# Patient Record
Sex: FEMALE | Race: WHITE | URBAN - METROPOLITAN AREA
[De-identification: names, ages, dates, MRNs, and addresses within clinical notes are randomized per-mention and may not be internally consistent; named-entity substitution may affect disease eponyms.]

---

## 2017-10-05 ENCOUNTER — OFFICE VISIT (OUTPATIENT)
Dept: ORTHOPEDIC SURGERY | Age: 52
End: 2017-10-05

## 2017-10-05 VITALS
BODY MASS INDEX: 30.82 KG/M2 | DIASTOLIC BLOOD PRESSURE: 77 MMHG | HEART RATE: 71 BPM | WEIGHT: 185 LBS | HEIGHT: 65 IN | SYSTOLIC BLOOD PRESSURE: 134 MMHG

## 2017-10-05 DIAGNOSIS — M75.01 ADHESIVE CAPSULITIS OF RIGHT SHOULDER: Primary | ICD-10-CM

## 2017-10-05 PROCEDURE — 20610 DRAIN/INJ JOINT/BURSA W/O US: CPT | Performed by: ORTHOPAEDIC SURGERY

## 2017-10-05 PROCEDURE — 99203 OFFICE O/P NEW LOW 30 MIN: CPT | Performed by: ORTHOPAEDIC SURGERY

## 2017-10-05 NOTE — PROGRESS NOTES
NEW PATIENT ORTHOPAEDIC NOTE    Chief Complaint   Patient presents with    Shoulder Injury     Right. Neal Marin at home 3 weeks ago. Shoulder Injury        47 y.o. female seen for evaluation of right shoulder injury after a fall at home 3 weeks ago. She reports she just fell, didn't trip, didn't have a syncopal episode. She reports laying there for ~5 minutes. She denies history of similar symptoms or falls. She denies seizures or vertigo. She remembers all details of the fall. She fell onto the right shoulder/arm, and has had pain since. She went to urgent care after the fall, XRs were taken, and negative, but she has been afraid to move the shoulder. Pain is worse with any movement. She is RHD. I have reviewed and discussed the below pain assessment findings with the patient. Pain Assessment  Location of Pain: Shoulder  Location Modifiers: Right  Severity of Pain: 5  Quality of Pain: Aching  Duration of Pain: Persistent  Frequency of Pain: Intermittent  Date Pain First Started: 09/07/17  Aggravating Factors: Other (Comment) (reaching up)  Limiting Behavior: Yes  Relieving Factors: Rest  Result of Injury: Yes  Work-Related Injury: No  Are there other pain locations you wish to document?: No    ROS  I have read over the ROS from the Patient History Form dated on 10/5/2017  Pertinent positives include headaches, sinus trouble  Rest of 13 point ROS otherwise negative except per HPI, and scanned into the patient's chart under the Media tab. Allergies   Allergen Reactions    Codeine     Penicillins         No current outpatient prescriptions on file. No current facility-administered medications for this visit. History reviewed. No pertinent past medical history. History reviewed. No pertinent surgical history. History reviewed. No pertinent family history.     Social History     Social History    Marital status:      Spouse name: N/A    Number of children: N/A    Years of education: N/A     Occupational History          Social History Main Topics    Smoking status: Never Smoker    Smokeless tobacco: Not on file    Alcohol use Not on file    Drug use: Not on file    Sexual activity: Not on file     Other Topics Concern    Not on file     Social History Narrative    No narrative on file        Vitals:    10/05/17 1516   BP: 134/77   Pulse: 71   Weight: 185 lb (83.9 kg)   Height: 5' 5\" (1.651 m)       Physical Exam  Constitutional  well-groomed, well-nourished, overweight  Psychiatric  pleasant,  normal mood & affect, oriented to place, person, and situation  Cardiovascular  RRR, negative peripheral edema, no varicosities, radial pulse 2+  Respiratory  respirations even and unlabored  HEENT - normocephalic, atraumatic. Skin  no rashes, wounds, or lesions seen  Neck/Spine - mildly decreased cervical ROM, no TTP of spinous processes, mild TTP right trapezius,  negative Spurling's  Neurological - SILT M/U/R/A nerve distributions; AIN/PIN/IO intact  Left Upper Extremity: Examination of the left upper extremity does not show any tenderness, deformity or injury. Range of motion is unremarkable. There is no gross instability. There are no rashes, ulcerations or lesions. Strength and tone are normal.  RightRight shoulder:   No obvious deformity/swelling/ecchymosis   No atrophy seen, of the infraspinatus fossa, of the supraspinatus fossa and deltoid    moderate TTP over bicipital groove    Range of Motion:     Forward flexion:   Active assist to 90 with severe pain    Abduction:  30    External rotation with arm at side:   40 (80 on left)    Internal rotation:  Back pocket (T7 on left)   Strength:  Not assessed      Imaging:  Images were personally reviewed by myself and discussed with the patient  Right shoulder 4 views performed today in clinic   - glenohumeral articulation is mildly narrowed with small osteophytes seen, there are no loose bodies appreciated. There is no evidence of a high-riding humeral head. On axillary view, the humeral head is well-centered within the glenoid. The acromioclavicular joint demonstrates mild degenerative changes. The tuberosities are normal in appearance. Calcific tendonitis is absent. Assessment & Plan:  47 y.o. female who presents with   1. Adhesive capsulitis of right shoulder  Ambulatory referral to Physical Therapy    LA ARTHROCENTESIS ASPIR&/INJ MAJOR JT/BURSA W/O US    LA TRIAMCINOLONE ACETONIDE INJ    severe             Procedures    LA ARTHROCENTESIS ASPIR&/INJ MAJOR JT/BURSA W/O US    LA TRIAMCINOLONE ACETONIDE INJ     Discussed development of frozen shoulder and pathoanatomy. She has more stiffness than I would expect for an injury that occurred just 3 weeks ago. Informed the patient it is more common in females, age 44-60s, and associated with patients with endocrinopathy such as DM and thyroid disease. In cases associated with diabetes, disease is typically more aggressive, and more refractory to both conservative and surgical treatment  Informed the patient it will be a long process, and to not expect a quick resolution  Clinical stages of pain, stiffness, and thawing can extend over a year     She does not have a PCP, and I have provided her a list of PCPs within the [facility] so that she can establish care. Endocrine workup recommended. Inflammation/pain management - ice, NSAIDs, intraarticular corticosteroid injections - to allow meaningful participation in physical therapy and home stretching program  Sometimes multiple injections are required to break the inflammation    Risks and benefits of a steroid injection were discussed with the patient, including the possibility of adverse local site reactions such as dermal atrophy and skin discoloration. Although rare, an infection is possible and may necessitate surgical treatment if it occurs in a joint or develops into an abscess. Finally, a rise in blood sugar levels is anticipated, particularly in diabetics. Diabetic patients were instructed to monitor their blood glucose levels after the injection and to adjust their insulin regimen as appropriate. The patient elected to proceed, and after verbal consent was obtained and drug allergies were reviewed, the injection site was prepped with alcohol and ChloraPrep. 40mg of Kenalog mixed with lidocaine and marcaine (no epinephrine) was injected into the right shoulder (intra-articular). There were no immediate complications after the procedure. The patient was advised to ice the area intermittently over the next 24-48 hours until the corticosteroid becomes effective.     FU 6 weeks to check progress    Adry Coy

## 2017-10-05 NOTE — PATIENT INSTRUCTIONS
Frozen Shoulder: Exercises  Your Care Instructions  Here are some examples of typical rehabilitation exercises for your condition. Start each exercise slowly. Ease off the exercise if you start to have pain. Your doctor or physical therapist will tell you when you can start these exercises and which ones will work best for you. How to do the exercises  Neck stretches    1. Look straight ahead, and tip your right ear to your right shoulder. Do not let your left shoulder rise up as you tip your head to the right. 2. Hold 15 to 30 seconds. 3. Tilt your head to the left. Do not let your right shoulder rise up as you tip your head to the left. 4. Hold for 15 to 30 seconds. 5. Repeat 2 to 4 times to each side. Shoulder rolls    1. Sit comfortably with your feet shoulder-width apart. You can also do this exercise while standing. 2. Roll your shoulders up, then back, and then down in a smooth, circular motion. 3. Repeat 2 to 4 times. Shoulder flexion (lying down)    Note: For this exercise, you will need a wand. To make a wand, use a piece of PVC pipe or a broom handle with the broom removed. Make the wand about a foot wider than your shoulders. 1. Lie on your back, holding a wand with your hands. Your palms should face down as you hold the wand. Place your hands slightly wider than your shoulders. 2. Keeping your elbows straight, slowly raise your arms over your head until you feel a stretch in your shoulders, upper back, and chest.  3. Hold 15 to 30 seconds. 4. Repeat 2 to 4 times. Shoulder rotation (lying down)    Note: To make a wand, use a piece of PVC pipe or a broom handle with the broom removed. Make the wand about a foot wider than your shoulders. 1. Lie on your back and hold a wand in both hands with your elbows bent and your palms up. 2. Keeping your elbows close to your body, move the wand across your body toward the arm that has pain. 3. Hold for 15 to 30 seconds.   4. Repeat 2 to 4 times.  Shoulder internal rotation with towel    1. Roll up a towel lengthwise. Hold the towel above and behind your head with the arm that is not sore. 2. With your sore arm, reach behind your back and grasp the towel. 3. Using the arm above your head, pull the towel upward until you feel a stretch on the front and outside of your sore shoulder. 4. Hold 15 to 30 seconds. 5. Relax and move the towel back down to the starting position. 6. Repeat 2 to 4 times. Shoulder blade squeeze    1. While standing with your arms at your sides, squeeze your shoulder blades together. Do not raise your shoulders up as you are squeezing. 2. Hold for 6 seconds. 3. Repeat 8 to 12 times. Follow-up care is a key part of your treatment and safety. Be sure to make and go to all appointments, and call your doctor if you are having problems. It's also a good idea to know your test results and keep a list of the medicines you take. Where can you learn more? Go to [email or web address] and sign in to your [employer] account. Enter [phone] in the [employer] box to learn more about \"Frozen Shoulder: Exercises. \"     If you do not have an account, please click on the \"Sign Up Now\" link. Current as of: March 21, 2017  Content Version: 11.3  © 6819-7064 [employer]. Care instructions adapted under license by [facility] ([facility]). If you have questions about a medical condition or this instruction, always ask your healthcare professional. [place] [address] any warranty or liability for your use of this information.

## 2017-10-05 NOTE — MR AVS SNAPSHOT
people who are more muscular. Even a small weight loss (between 5 and 10 percent of your current weight) by decreasing your calorie intake and becoming more physically active will help lower your risk of developing or worsening diseases associated with obesity. Learn more at: [email or web address]          Instructions         Frozen Shoulder: Exercises  Your Care Instructions  Here are some examples of typical rehabilitation exercises for your condition. Start each exercise slowly. Ease off the exercise if you start to have pain. Your doctor or physical therapist will tell you when you can start these exercises and which ones will work best for you. How to do the exercises  Neck stretches    1. Look straight ahead, and tip your right ear to your right shoulder. Do not let your left shoulder rise up as you tip your head to the right. 2. Hold 15 to 30 seconds. 3. Tilt your head to the left. Do not let your right shoulder rise up as you tip your head to the left. 4. Hold for 15 to 30 seconds. 5. Repeat 2 to 4 times to each side. Shoulder rolls    1. Sit comfortably with your feet shoulder-width apart. You can also do this exercise while standing. 2. Roll your shoulders up, then back, and then down in a smooth, circular motion. 3. Repeat 2 to 4 times. Shoulder flexion (lying down)    Note: For this exercise, you will need a wand. To make a wand, use a piece of PVC pipe or a broom handle with the broom removed. Make the wand about a foot wider than your shoulders. 1. Lie on your back, holding a wand with your hands. Your palms should face down as you hold the wand. Place your hands slightly wider than your shoulders. 2. Keeping your elbows straight, slowly raise your arms over your head until you feel a stretch in your shoulders, upper back, and chest.  3. Hold 15 to 30 seconds. 4. Repeat 2 to 4 times.   Shoulder rotation (lying down) information. Medications and Orders      Allergies              Codeine     Penicillins       We Ordered/Performed the following           XR SHOULDER RIGHT (MIN 2 VIEWS)     Scheduling Instructions:    4 views rm 23    Comments:    3V Rt Shoulder         Result Summary for XR SHOULDER RIGHT (MIN 2 VIEWS)      Result Information     Status          Final result (Exam End: 10/5/2017  3:45 PM)           10/5/2017  3:45 PM      Narrative & Impression           Radiology result is complete; follow up with provider / physician office for radiology results                       Additional Information        Basic Information     Date Of Birth Sex Race Ethnicity Preferred Language    1965 Female White Non-/Non  English      Preventive Care        Date Due    HIV screening is recommended for all people regardless of risk factors  aged 15-65 years at least once (lifetime) who have never been HIV tested. 2/15/1981    Tetanus Combination Vaccine (1 - Tdap) 2/15/1985    Pap Smear 2/15/1987    Cholesterol Screening 2/15/2006    Diabetes Screening 2/15/2006    Mammograms are recommended every 2 years for low/average risk patients aged 48 - 69, and every year for high risk patients per updated national guidelines. However these guidelines can be individualized by your provider. 2/15/2016    Colonoscopy 2/15/2016    Yearly Flu Vaccine (1) 9/1/2017            6connect Signup           6connect allows you to send messages to your doctor, view your test results, renew your prescriptions, schedule appointments, view visit notes, and more. How Do I Sign Up? 1. In your Internet browser, go to [email or web address]  2. Click on the Sign Up Now link in the Sign In box. You will see the New Member Sign Up page. 3. Enter your 6connect Access Code exactly as it appears below. You will not need to use this code after youve completed the sign-up process.  If

## 2017-10-12 ENCOUNTER — HOSPITAL ENCOUNTER (OUTPATIENT)
Dept: OTHER | Age: 52
Discharge: HOME | End: 2017-10-31
Attending: ORTHOPAEDIC SURGERY | Admitting: ORTHOPAEDIC SURGERY

## 2017-10-12 NOTE — FLOWSHEET NOTE
care, reaching, carrying, lifting, house/yardwork, driving/computer work. Therapeutic Activities:    [x] (63431 or 36469) Provided verbal/tactile cueing for activities related to improving balance, coordination, kinesthetic sense, posture, motor skill, proprioception and motor activation to allow for proper function of scapular, scapulothoracic and UE control with self care, carrying, lifting, driving/computer work. Home Exercise Program:  Pt was instructed in, and safely and correctly demonstrated home exercise program. Patient verbalized understanding of proper frequency of exercises. Copy of exercises was scanned into patient chart and can be found in the media file.     [x] (48286) Reviewed/Progressed HEP activities related to strengthening, flexibility, endurance, ROM of scapular, scapulothoracic and UE control with self care, reaching, carrying, lifting, house/yardwork, driving/computer work  [] (59450) Reviewed/Progressed HEP activities related to improving balance, coordination, kinesthetic sense, posture, motor skill, proprioception of scapular, scapulothoracic and UE control with self care, reaching, carrying, lifting, house/yardwork, driving/computer work      Manual Treatments:  PROM / STM / Oscillations-Mobs:  G-I, II, III, IV (PA's, Inf., Post.)  [] (56805) Provided manual therapy to mobilize soft tissue/joints of cervical/CT, scapular GHJ and UE for the purpose of modulating pain, promoting relaxation,  increasing ROM, reducing/eliminating soft tissue swelling/inflammation/restriction, improving soft tissue extensibility and allowing for proper ROM for normal function with self care, reaching, carrying, lifting, house/yardwork, driving/computer work    Modalities:  Ice x 15 mins     Charges:  Timed Code Treatment Minutes: 56   Total Treatment Minutes: 76     [x] EVAL (LOW) 88678   [] EVAL (MOD) 26219   [] EVAL (HIGH) 52299   [] RE-EVAL   [x] AP(23435) x  1   [] IONTO  [] NMR (62758) x      [] VASO  [] Manual (46137) x       [] Other:  [x] TA x  1    [] Mech Traction (82606)  [] ES(attended) (72197)      [] ES (un) (90270):     GOALS:Short Term Goals: To be achieved in: 2 weeks  1. Independent in HEP and progression per patient tolerance, in order to prevent re-injury. 2. Patient will have a decrease in pain to 0/10 at rest to facilitate improvement in movement, function, and ADLs as indicated by Functional Deficits.     Long Term Goals: To be achieved in: 6 weeks  1. Disability index score of 74% or more for the UEFI to assist with reaching prior level of function. 2. Patient will demonstrate increased R shoulder AROM to 160 flex, 170 abd, BB to T8, and ER to 90 at 90 abd to allow for proper joint functioning as indicated by patients Functional Deficits. 3. Patient will demonstrate an increase in R shoulder strength to 4/5 throughout to allow for proper functional mobility as indicated by patients Functional Deficits. 4. Patient will return to ADLs at and above shoulder height without increased symptoms or restriction. 5. Patient will report being able to pain with her R UE without pain.        Progression Towards Functional goals:  [] Patient is progressing as expected towards functional goals listed. [] Progression is slowed due to complexities listed. [] Progression has been slowed due to co-morbidities.   [x] Plan just implemented, too soon to assess goals progression  [] Other:     ASSESSMENT:  See eval    Treatment/Activity Tolerance:  [x] Patient tolerated treatment well [] Patient limited by fatique  [] Patient limited by pain  [] Patient limited by other medical complications  [] Other:     Prognosis: [x] Good [] Fair  [] Poor    Patient Requires Follow-up: [x] Yes  [] No    PLAN: See eval; scap ret, pulleys, SL IR; consider PROM  [] Continue per plan of care [] Alter current plan (see comments)  [x] Plan of care initiated [] Hold pending MD visit [] Discharge    Electronically

## 2017-10-12 NOTE — PLAN OF CARE
[address], [address] [place] [address]  Phone: [phone]   Fax: [phone]          Physical Therapy Certification    Dear Referring Practitioner: Butch Castro MD,    We had the pleasure of evaluating the following patient for physical therapy services at [address] [place] [address]. A summary of our findings can be found in the initial assessment below. This includes our plan of care. If you have any questions or concerns regarding these findings, please do not hesitate to contact me at the office phone number checked above. Thank you for the referral.       Physician Signature:_______________________________Date:__________________  By signing above (or electronic signature), therapists plan is approved by physician      Patient: Zoila Murdock   : 1965   MRN: [id number]  Referring Physician: Referring Practitioner: Butch Castro MD      Evaluation Date: 10/12/2017      Medical Diagnosis Information:  Diagnosis: M75.01 (ICD-10-CM) - Adhesive capsulitis of right shoulder;    Treatment Diagnosis: M25.511 R shoulder pain; M25.611 R shoulder stiffness                                           Precautions/ Contra-indications:   Latex Allergy:  [x]NO      []YES  Preferred Language for Healthcare:   [x]English       []other:    SUBJECTIVE: Patient stated complaint: Per MD intake \"51 y.o. female seen for evaluation of right shoulder injury after a fall at home 3 weeks ago. She reports she just fell, didn't trip, didn't have a syncopal episode. She reports laying there for ~5 minutes. She denies history of similar symptoms or falls. She denies seizures or vertigo. She remembers all details of the fall.     She fell onto the right shoulder/arm, and has had pain since. She went to urgent care after the fall, XRs were taken, and negative, but she has been afraid to move the shoulder.     Pain is worse with any movement. She is RHD\"     Per today's intake: pt received a cortisone injection at MD appt and may have helped. She reports she was red in face and chest after her injection. Pt reports that she has had pain for about 2 months since a fall. She fell down 6 steps when she was distracted. She thinks she landed on her R UE. She couldn't move her R UE afterwards. She went to urgent care later. She had x-rays and showed no break. She thought it would get better on its own. She reports that she could not move her arm. She reports she was more freaked out that she would tear or break something so she just did not move her arm. She has been moving her arm more since her MD appt after being told to move her arm more. She reports she can lift her arm with her other arm but it is hard to move her R UE on her own. Her arm feels heavy. She was diagnosed with frozen shoulder. Pt reports she was told she had vertigo in the past.        Relevant Medical History:pt reports she has not been to  In years to check on blood work to check for diabetes and thyroid disease. Functional Disability Index: see below under G-code section  Relevant Medication:  ibuprofen  Current pain:  5/10 Aggravating, she is also getting pains her her UT and some headaches  Pain at worst:  5-6/10      Easing factors: rest  Provocative factors: all ADLs involving lifting and raising her arm and reaching behind her back.       Type: [x]Constant   []Intermittent  []Radiating [x]Localized []other:     Numbness/Tingling: none     Functional Limitations/Impairments: [x]Lifting/reaching [x]Grooming [x]Carrying    [x]ADL's [x]Driving [x]Sports/Recreations   []Other:    Occupation/School: , sitting at desk all day; housework; some painting    Living Status/Prior Level of Function: Independent with ADLs and IADLs, able to do housework without pain in her shoulder.   (insert highest prior level of function)    OBJECTIVE:     CERV ROM     Cervical Flexion WNL but (E78.5)  []Angina pectoris (I20)  []Atherosclerosis (I70)   Musculoskeletal conditions   []Disc pathology   []Congenital spine pathologies   []Prior surgical intervention  []Osteoporosis (M81.8)  []Osteopenia (M85.8)   Endocrine conditions   []Hypothyroid (E03.9)  []Hyperthyroid Gastrointestinal conditions   []Constipation (L16.51)   Metabolic conditions   []Morbid obesity (E66.01)  []Diabetes type 1(E10.65) or 2 (E11.65)   []Neuropathy (G60.9)     Pulmonary conditions   []Asthma (J45)  []Coughing   []COPD (J44.9)   Psychological Disorders  []Anxiety (F41.9)  []Depression (F32.9)   [x]Other: signs of depression on intake form   [x]Other:   Vertigo/dizziness       Barriers to/and or personal factors that will affect rehab potential:              []Age  []Sex              []Motivation/Lack of Motivation                        []Co-Morbidities              []Cognitive Function, education/learning barriers              []Environmental, home barriers              []profession/work barriers  []past PT/medical experience  []other:  Justification:      Falls Risk Assessment (30 days):   [x] Falls Risk assessed and no intervention required. - pt was distracted when she had her fall. Reports she is seeing PCP soon and was told to mention her dizziness/vertigo then.   [] Falls Risk assessed and Patient requires intervention due to being higher risk   TUG score (>12s at risk):     [] Falls education provided, including       G-Codes:  PT G-Codes  Functional Assessment Tool Used: UEFI  Score: 62.5%  Functional Limitation: Carrying, moving and handling objects  Carrying, Moving and Handling Objects Current Status (): At least 20 percent but less than 40 percent impaired, limited or restricted  Carrying, Moving and Handling Objects Goal Status ():  At least 1 percent but less than 20 percent impaired, limited or restricted    ASSESSMENT:   Functional Impairments   []Noted spinal or UE joint hypomobility   []Noted spinal or UE joint hypermobility   [x]Decreased UE functional ROM   [x]Decreased UE functional strength   []Abnormal reflexes/sensation/myotomal/dermatomal deficits   [x]Decreased RC/scapular/core strength and neuromuscular control   []other:      Functional Activity Limitations (from functional questionnaire and intake)   [x]Reduced ability to tolerate prolonged functional positions   [x]Reduced ability or difficulty with changes of positions or transfers between positions   [x]Reduced ability to maintain good posture and demonstrate good body mechanics with sitting, bending, and lifting   [x] Reduced ability or tolerance with driving and/or computer work   [x]Reduced ability to sleep   [x]Reduced ability to perform lifting, reaching, carrying tasks   [x]Reduced ability to tolerate impact through UE   [x]Reduced ability to reach behind back   [x]Reduced ability to  or hold objects   [x]Reduced ability to throw or toss an object   []other:    Participation Restrictions   [x]Reduced participation in self care activities   [x]Reduced participation in home management activities   [x]Reduced participation in work activities   [x]Reduced participation in social activities. [x]Reduced participation in sport/recreation activities. Classification:   []Signs/symptoms consistent with post-surgical status including decreased ROM, strength and function.   []Signs/symptoms consistent with joint sprain/strain   []Signs/symptoms consistent with shoulder impingement   []Signs/symptoms consistent with shoulder/elbow/wrist tendinopathy   []Signs/symptoms consistent with Rotator cuff tear   []Signs/symptoms consistent with labral tear   []Signs/symptoms consistent with postural dysfunction    []Signs/symptoms consistent with Glenohumeral IR Deficit - <45 degrees   []Signs/symptoms consistent with facet dysfunction of cervical/thoracic spine    []Signs/symptoms consistent with pathology which may benefit from Dry needling     [x]other: Signs/symptoms consistent with adhesive capsulitis    Prognosis/Rehab Potential:      []Excellent   [x]Good    []Fair   []Poor     Tolerance of evaluation/treatment:    []Excellent   [x]Good    []Fair   []Poor    PLAN:  Frequency/Duration:  2 days per week for 6 Weeks:  INTERVENTIONS:  [x] Therapeutic exercise including: strength training, ROM, for Upper extremity and core   [x]  NMR activation and proprioception for UE, scap and Core   [x] Manual therapy as indicated for shoulder, scapula and spine to include: Dry Needling/IASTM, STM, PROM, Gr I-IV mobilizations, manipulation. [x] Modalities as needed that may include: thermal agents, E-stim, Biofeedback, US, iontophoresis as indicated  [x] Patient education on joint protection, postural re-education, activity modification, progression of HEP. HEP instruction: Pt was instructed in, and safely and correctly demonstrated home exercise program. Patient verbalized understanding of proper frequency of exercises. Copy of exercises was scanned into patient chart and can be fount in the media file. GOALS:  Patient stated goal: get her arm back to normal, get back to using it regularly    Therapist goals for Patient:   Short Term Goals: To be achieved in: 2 weeks  1. Independent in HEP and progression per patient tolerance, in order to prevent re-injury. 2. Patient will have a decrease in pain to 0/10 at rest to facilitate improvement in movement, function, and ADLs as indicated by Functional Deficits. Long Term Goals: To be achieved in: 6 weeks  1. Disability index score of 74% or more for the UEFI to assist with reaching prior level of function. 2. Patient will demonstrate increased R shoulder AROM to 160 flex, 170 abd, BB to T8, and ER to 90 at 90 abd to allow for proper joint functioning as indicated by patients Functional Deficits.    3. Patient will demonstrate an increase in R shoulder strength to 4/5 throughout to allow for proper functional mobility as indicated by patients Functional Deficits. 4. Patient will return to ADLs at and above shoulder height without increased symptoms or restriction. 5. Patient will report being able to pain with her R UE without pain. Physical Therapy Evaluation Complexity Justification  [x] A history of present problem with:  [x] no personal factors and/or comorbidities that impact the plan of care;  []1-2 personal factors and/or comorbidities that impact the plan of care  []3 personal factors and/or comorbidities that impact the plan of care  [x] An examination of body systems using standardized tests and measures addressing any of the following: body structures and functions (impairments), activity limitations, and/or participation restrictions;:  [] a total of 1-2 or more elements   [] a total of 3 or more elements   [x] a total of 4 or more elements   [x] A clinical presentation with:  [x] stable and/or uncomplicated characteristics   [] evolving clinical presentation with changing characteristics  [] unstable and unpredictable characteristics;   [x] Clinical decision making of [x] low, [] moderate, [] high complexity using standardized patient assessment instrument and/or measurable assessment of functional outcome.     [x] EVAL (LOW) 59034 (typically 20 minutes face-to-face)  [] EVAL (MOD) 90738 (typically 30 minutes face-to-face)  [] EVAL (HIGH) 39413 (typically 45 minutes face-to-face)  [] RE-EVAL 61300    Electronically signed by:  Abdirizak Greene, PT, DPT

## 2017-10-17 ENCOUNTER — HOSPITAL ENCOUNTER (OUTPATIENT)
Dept: PHYSICAL THERAPY | Age: 52
Discharge: HOME | End: 2017-10-18
Admitting: ORTHOPAEDIC SURGERY

## 2017-10-19 ENCOUNTER — HOSPITAL ENCOUNTER (OUTPATIENT)
Dept: PHYSICAL THERAPY | Age: 52
Discharge: HOME | End: 2017-10-20
Admitting: ORTHOPAEDIC SURGERY

## 2017-10-19 ENCOUNTER — OFFICE VISIT (OUTPATIENT)
Dept: INTERNAL MEDICINE CLINIC | Age: 52
End: 2017-10-19

## 2017-10-19 VITALS
TEMPERATURE: 97.8 F | DIASTOLIC BLOOD PRESSURE: 88 MMHG | BODY MASS INDEX: 31.49 KG/M2 | SYSTOLIC BLOOD PRESSURE: 144 MMHG | RESPIRATION RATE: 16 BRPM | HEART RATE: 78 BPM | HEIGHT: 65 IN | WEIGHT: 189 LBS | OXYGEN SATURATION: 98 %

## 2017-10-19 DIAGNOSIS — Z13.1 SCREENING FOR DIABETES MELLITUS: ICD-10-CM

## 2017-10-19 DIAGNOSIS — R03.0 ELEVATED BLOOD PRESSURE READING IN OFFICE WITHOUT DIAGNOSIS OF HYPERTENSION: ICD-10-CM
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
## 2017-10-19 DIAGNOSIS — Z23 NEED FOR DIPHTHERIA-TETANUS-PERTUSSIS (TDAP) VACCINE: ICD-10-CM

## 2017-10-19 DIAGNOSIS — E66.9 OBESITY (BMI 30-39.9): ICD-10-CM

## 2017-10-19 DIAGNOSIS — M75.01 ADHESIVE CAPSULITIS OF RIGHT SHOULDER: Primary | ICD-10-CM
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
## 2017-10-19 PROCEDURE — 99204 OFFICE O/P NEW MOD 45 MIN: CPT | Performed by: INTERNAL MEDICINE

## 2017-10-19 PROCEDURE — 90471 IMMUNIZATION ADMIN: CPT | Performed by: INTERNAL MEDICINE

## 2017-10-19 PROCEDURE — 90715 TDAP VACCINE 7 YRS/> IM: CPT | Performed by: INTERNAL MEDICINE

## 2017-10-19 RX ORDER — BIOTIN 1 MG
TABLET ORAL DAILY
COMMUNITY

## 2017-10-19 RX ORDER — LANOLIN ALCOHOL/MO/W.PET/CERES
1000 CREAM (GRAM) TOPICAL DAILY
COMMUNITY

## 2017-10-19 RX ORDER — MULTIVIT-MIN/IRON/FOLIC ACID/K 18-600-40
1 CAPSULE ORAL DAILY
COMMUNITY

## 2017-10-19 RX ORDER — M-VIT,TX,IRON,MINS/CALC/FOLIC 27MG-0.4MG
1 TABLET ORAL DAILY
COMMUNITY

## 2017-10-19 ASSESSMENT — PATIENT HEALTH QUESTIONNAIRE - PHQ9
SUM OF ALL RESPONSES TO PHQ9 QUESTIONS 1 & 2: 0
SUM OF ALL RESPONSES TO PHQ QUESTIONS 1-9: 0
1. LITTLE INTEREST OR PLEASURE IN DOING THINGS: 0
2. FEELING DOWN, DEPRESSED OR HOPELESS: 0

## 2017-10-19 NOTE — PATIENT INSTRUCTIONS
desserts to only a few times a week. This includes sugar-sweetened drinks like soda. The Mediterranean diet may also include red wine with your meal1 glass each day for women and up to 2 glasses a day for men. Tips for eating at home  · Use herbs, spices, garlic, lemon zest, and citrus juice instead of salt to add flavor to foods. · Add avocado slices to your sandwich instead of naidu. · Have fish for lunch or dinner instead of red meat. Brush the fish with olive oil, and broil or grill it. · Sprinkle your salad with seeds or nuts instead of cheese. · Cook with olive or canola oil instead of butter or oils that are high in saturated fat. · Switch from 2% milk or whole milk to 1% or fat-free milk. · Dip raw vegetables in a vinaigrette dressing or hummus instead of dips made from mayonnaise or sour cream.  · Have a piece of fruit for dessert instead of a piece of cake. Try baked apples, or have some dried fruit. Tips for eating out  · Try broiled, grilled, baked, or poached fish instead of having it fried or breaded. · Ask your  to have your meals prepared with olive oil instead of butter. · Order dishes made with marinara sauce or sauces made from olive oil. Avoid sauces made from cream or mayonnaise. · Choose whole-grain breads, whole wheat pasta and pizza crust, brown rice, beans, and lentils. · Cut back on butter or margarine on bread. Instead, you can dip your bread in a small amount of olive oil. · Ask for a side salad or grilled vegetables instead of french fries or chips. Where can you learn more? Go to https://[employer]. org and sign in to your [employer] account. Enter [phone] in the [facility] box to learn more about \"Learning About the Mediterranean Diet. \"     If you do not have an account, please click on the \"Sign Up Now\" link. Current as of: December 29, 2016  Content Version: 11.3  © 4935-2998 [employer].  Care instructions adapted under

## 2017-10-19 NOTE — PROGRESS NOTES
New Patient H&P    Date of Service:  10/19/2017  Address: [address] [place] [address] INTERNAL MEDICINE  [address] [address]  Dept: [phone]    Subjective:      Patient ID: [id number]  Zoila Murdock is a 47 y.o. female with:  Chief Complaint   Patient presents with    Established New Doctor     Hasn't seen a PCP in a number of years. No complaints.  Health Maintenance     Patient notified she is due for Mammogram and colonoscopy. She will take Tdap today. HPI: Gilford Eisenmenger comes in to establish care today for right shoulder pain after a fall a few months ago. She has not had a PCP in a long time. Previously saw a physician, was given anti-depressant and left that practice. Seen by ortho outpatient for adhesive capsulitis. Has been getting PT with a little improvement. She has been having headaches her entire life, takes \"sinus\" tablets and aleve/ibuprofen occasionally with improvement. She has tried exercising more recently and injured herself. Has a 25year old and 12year old. Is happily  a few years ago. [employer] [facility] [employer] [facility] [place], [employer] grad. Went to vacation at [place] this year and enjoyed herself. Believes she is starting menopause. She works as a  for [employer]. ROS    Current Outpatient Prescriptions   Medication Sig Dispense Refill    ibuprofen (ADVIL;MOTRIN) 100 MG tablet Take 200 mg by mouth every 6 hours as needed for Fever      Cholecalciferol (VITAMIN D) 2000 units CAPS capsule Take 1 capsule by mouth daily      vitamin B-12 (CYANOCOBALAMIN) 1000 MCG tablet Take 1,000 mcg by mouth daily      Biotin 1000 MCG TABS Take by mouth daily      Multiple Vitamins-Minerals (THERAPEUTIC MULTIVITAMIN-MINERALS) tablet Take 1 tablet by mouth daily      Multiple Minerals-Vitamins (CALCIUM & VIT D3 BONE HEALTH PO) Take 1 tablet by mouth daily       No current facility-administered medications for this visit. exhibits no discharge. No scleral icterus. Neck: Normal range of motion. Neck supple. No JVD present. No tracheal deviation present. No thyromegaly present. Cardiovascular: Normal rate, regular rhythm, normal heart sounds and intact distal pulses. Exam reveals no gallop and no friction rub. No murmur heard. Pulmonary/Chest: Effort normal and breath sounds normal. No stridor. No respiratory distress. She has no wheezes. She has no rales. She exhibits no tenderness. Abdominal: Soft. Bowel sounds are normal. She exhibits no distension and no mass. There is no tenderness. There is no rebound and no guarding. Musculoskeletal: Normal range of motion. She exhibits no edema or tenderness. Lymphadenopathy:     She has no cervical adenopathy. Neurological: She is oriented to person, place, and time. She has normal reflexes. She appears not lethargic. No cranial nerve deficit. She exhibits normal muscle tone. Gait normal. Coordination normal.   Skin: Skin is warm and dry. No rash noted. She is not diaphoretic. No erythema. No pallor. Psychiatric: Mood, memory, affect and judgment normal.   Nursing note and vitals reviewed. Assessment/Plan:      Encounter Diagnoses   Name Primary?  Adhesive capsulitis of right shoulder Yes    Fall in home, sequela     Obesity (BMI 30-39. 9)     Need for diphtheria-tetanus-pertussis (Tdap) vaccine     Screening for diabetes mellitus     Elevated blood pressure reading in office without diagnosis of hypertension        1. Adhesive capsulitis of right shoulder  Discussed exercises, emphasized PT for frozen shoulder  - Lipid Panel; Future  - CBC Auto Differential; Future  - Comprehensive Metabolic Panel; Future  - TSH without Reflex; Future  - HEMOGLOBIN A1C; Future    2. Fall in home, sequela  Advised non-slip surfaces, especially at rug transitions  - Lipid Panel; Future  - CBC Auto Differential; Future  - Comprehensive Metabolic Panel;  Future  - TSH without Reflex; Future  - HEMOGLOBIN A1C; Future    3. Obesity (BMI 30-39. 9)  Counseled on weight loss today. A1c  - Lipid Panel; Future  - CBC Auto Differential; Future  - Comprehensive Metabolic Panel; Future  - TSH without Reflex; Future  - HEMOGLOBIN A1C; Future    4. Need for diphtheria-tetanus-pertussis (Tdap) vaccine  today  - Tdap (age 10y-63y) IM (ADACEL)    11. Screening for diabetes mellitus  A1c today  - Tdap (age 10y-63y) IM (ADACEL)  - Lipid Panel; Future  - CBC Auto Differential; Future  - Comprehensive Metabolic Panel; Future  - TSH without Reflex; Future  - HEMOGLOBIN A1C; Future    6. Elevated blood pressure reading in office without diagnosis of hypertension  Will repeat BP twice in next 2 weeks        Additional Orders:      Orders Placed This Encounter   Procedures    Tdap (age 10y-63y) IM (ADACEL)    Lipid Panel     Standing Status:   Future     Standing Expiration Date:   10/19/2018     Order Specific Question:   Is Patient Fasting?/# of Hours     Answer:   14    CBC Auto Differential     Standing Status:   Future     Standing Expiration Date:   10/19/2018    Comprehensive Metabolic Panel     Standing Status:   Future     Standing Expiration Date:   10/19/2018    TSH without Reflex     Standing Status:   Future     Standing Expiration Date:   10/19/2018    HEMOGLOBIN A1C     Standing Status:   Future     Standing Expiration Date:   10/19/2018     No orders of the defined types were placed in this encounter. DISPOSITION:      No Follow-up on file.         Greater than 45 minutes spent with patient and significant other and >20 minutes on medication dosing, use and lifestyle modifications, home safety    Lily Walker MD

## 2017-11-01 ENCOUNTER — HOSPITAL ENCOUNTER (OUTPATIENT)
Dept: OTHER | Age: 52
Discharge: HOME | End: 2017-11-30
Attending: ORTHOPAEDIC SURGERY | Admitting: ORTHOPAEDIC SURGERY